# Patient Record
Sex: FEMALE | Race: AMERICAN INDIAN OR ALASKA NATIVE | NOT HISPANIC OR LATINO | URBAN - METROPOLITAN AREA
[De-identification: names, ages, dates, MRNs, and addresses within clinical notes are randomized per-mention and may not be internally consistent; named-entity substitution may affect disease eponyms.]

---

## 2023-11-01 PROBLEM — H52.203 MYOPIA OF BOTH EYES WITH ASTIGMATISM: Status: ACTIVE | Noted: 2023-11-01

## 2023-11-01 PROBLEM — M25.511 RIGHT SHOULDER PAIN: Status: ACTIVE | Noted: 2023-11-01

## 2023-11-01 PROBLEM — M77.31 CALCANEAL SPUR OF RIGHT FOOT: Status: ACTIVE | Noted: 2023-11-01

## 2023-11-01 PROBLEM — M25.521 RIGHT ELBOW PAIN: Status: ACTIVE | Noted: 2023-11-01

## 2023-11-01 PROBLEM — H52.13 MYOPIA OF BOTH EYES WITH ASTIGMATISM: Status: ACTIVE | Noted: 2023-11-01

## 2023-11-01 PROBLEM — J30.1 HAYFEVER: Status: ACTIVE | Noted: 2023-11-01

## 2023-11-01 PROBLEM — R00.2 PALPITATION: Status: ACTIVE | Noted: 2023-11-01

## 2023-11-01 PROBLEM — R92.0 ABNORMAL MAMMOGRAM WITH MICROCALCIFICATION: Status: ACTIVE | Noted: 2023-11-01

## 2023-11-01 RX ORDER — EPINASTINE HYDROCHLORIDE 0.5 MG/ML
SOLUTION/ DROPS OPHTHALMIC 2 TIMES DAILY
COMMUNITY
Start: 2023-05-04

## 2023-11-01 RX ORDER — ETONOGESTREL AND ETHINYL ESTRADIOL VAGINAL RING .015; .12 MG/D; MG/D
RING VAGINAL
COMMUNITY

## 2023-11-01 RX ORDER — AZELASTINE 1 MG/ML
1 SPRAY, METERED NASAL 2 TIMES DAILY
COMMUNITY
Start: 2023-07-06

## 2023-11-02 ENCOUNTER — CLINICAL SUPPORT (OUTPATIENT)
Dept: ALLERGY | Facility: CLINIC | Age: 53
End: 2023-11-02
Payer: COMMERCIAL

## 2023-11-02 DIAGNOSIS — J30.1 ACUTE SEASONAL ALLERGIC RHINITIS DUE TO POLLEN: Primary | ICD-10-CM

## 2023-11-02 PROCEDURE — 95117 IMMUNOTHERAPY INJECTIONS: CPT | Performed by: PEDIATRICS

## 2023-11-02 NOTE — PROGRESS NOTES
"Subjective   Patient ID: Halley Dave is a 52 y.o. female.    Chief Complaint: Allergy Shot      ALLERGEN IMMUNOTHERAPY ADMINISTRATION FORM:  ##########################################################  Vial A: ct, dog, dust mite   Vial B: tree, ragweed, weeds     HALLEY has reached the allergy shot MAINTENANCE dose on: 12/27/2018  ##########################################################       ========================================  06/02/2022 - allergy testing showed hugely improved results. We'll continue the shots for 1 more year and talk about stopping after May 2023  ========================================          Vial A 1:1 0.5 ml 08/03/2023 12:31 PM,Y;Y;R;300/;No Reaction;RRA  Vial B 1:1 0.5 ml _____________\"______________L;No Reaction;RRA     Vial A 1:1 0.5 ml 08/31/2023 11:50 AM,Y;Y;R;350/;No Reaction;RRA  Vial B 1:1 0.5 ml _____________\"______________L;No Reaction;RRA     Vial A 1:1 0.5 ml 09/28/2023 2:48 PM,Y;Y;R;320/;No Reaction;RRA  Vial B 1:1 0.5 ml _____________\"______________L;No Reaction;RRA     Vial A 1:1 0.5 ml   Vial B 1:1 0.5 ml11/02/23 11:34 AM R:RRA   ________________________L; RRA  -Issues last injection: None  -Allergy meds taken today:  Yes  -Pre Peakflow: 310  -Post Peakflow:  -Reaction: None  -Next shot in 4 weeks    Vial A 1:1 0.5 ml   Vial B 1:1 0.5 ml     Vial A 1:1 0.5 ml   Vial B 1:1 0.5 ml      Signatures   Electronically signed by : Donna Abdul MD; Oct  2 2023  1:04AM EST (Author)       "

## 2023-11-30 ENCOUNTER — CLINICAL SUPPORT (OUTPATIENT)
Dept: ALLERGY | Facility: CLINIC | Age: 53
End: 2023-11-30
Payer: COMMERCIAL

## 2023-11-30 DIAGNOSIS — J30.1 ACUTE SEASONAL ALLERGIC RHINITIS DUE TO POLLEN: Primary | ICD-10-CM

## 2023-11-30 NOTE — PROGRESS NOTES
"Subjective   Patient ID: Halley Dave is a 52 y.o. female.    Chief Complaint: Allergy Shot      ALLERGEN IMMUNOTHERAPY ADMINISTRATION FORM:  ##########################################################  Vial A: ct, dog, dust mite   Vial B: tree, ragweed, weeds     HALLEY has reached the allergy shot MAINTENANCE dose on: 12/27/2018  ##########################################################       ========================================  06/02/2022 - allergy testing showed hugely improved results. We'll continue the shots for 1 more year and talk about stopping after May 2023  ========================================          Vial A 1:1 0.5 ml 08/03/2023 12:31 PM,Y;Y;R;300/;No Reaction;RRA  Vial B 1:1 0.5 ml _____________\"______________L;No Reaction;RRA     Vial A 1:1 0.5 ml 08/31/2023 11:50 AM,Y;Y;R;350/;No Reaction;RRA  Vial B 1:1 0.5 ml _____________\"______________L;No Reaction;RRA     Vial A 1:1 0.5 ml 09/28/2023 2:48 PM,Y;Y;R;320/;No Reaction;RRA  Vial B 1:1 0.5 ml _____________\"______________L;No Reaction;RRA     Vial A 1:1 0.5 ml   Vial B 1:1 0.5 ml11/02/23 11:34 AM R:RRA   ________________________L; RRA  -Issues last injection: None  -Allergy meds taken today:  Yes  -Pre Peakflow: 310  -Post Peakflow:  -Reaction: None  -Next shot in 4 weeks    Vial A 1:1 0.5 ml 11/30/23 11:47 AM R:RRA  Vial B 1:1 0.5 ml________________________L; RRA  -Issues last injection: None  -Allergy meds taken today:  Yes  -Pre Peakflow: 330  -Post Peakflow:  -Reaction: None  -Next shot in 4 weeks     Vial A 1:1 0.5 ml   Vial B 1:1 0.5 ml     Vial A 1:1 0.5 ml   Vial B 1:1 0.5 ml     Vial A 1:1 0.5 ml   Vial B 1:1 0.5 ml     Vial A 1:1 0.5 ml   Vial B 1:1 0.5 ml     Vial A 1:1 0.5 ml   Vial B 1:1 0.5 ml   "

## 2023-12-28 ENCOUNTER — CLINICAL SUPPORT (OUTPATIENT)
Dept: ALLERGY | Facility: CLINIC | Age: 53
End: 2023-12-28
Payer: COMMERCIAL

## 2023-12-28 DIAGNOSIS — J30.1 ACUTE SEASONAL ALLERGIC RHINITIS DUE TO POLLEN: Primary | ICD-10-CM

## 2023-12-28 PROCEDURE — 95117 IMMUNOTHERAPY INJECTIONS: CPT | Performed by: PEDIATRICS

## 2023-12-28 NOTE — PROGRESS NOTES
"Subjective   Patient ID: Halley Dave is a 52 y.o. female.    Chief Complaint: Allergy Shot      ALLERGEN IMMUNOTHERAPY ADMINISTRATION FORM:  ##########################################################  Vial A: ct, dog, dust mite   Vial B: tree, ragweed, weeds     HALLEY has reached the allergy shot MAINTENANCE dose on: 12/27/2018  ##########################################################       ========================================  06/02/2022 - allergy testing showed hugely improved results. We'll continue the shots for 1 more year and talk about stopping after May 2023  ========================================          Vial A 1:1 0.5 ml 08/03/2023 12:31 PM,Y;Y;R;300/;No Reaction;RRA  Vial B 1:1 0.5 ml _____________\"______________L;No Reaction;RRA     Vial A 1:1 0.5 ml 08/31/2023 11:50 AM,Y;Y;R;350/;No Reaction;RRA  Vial B 1:1 0.5 ml _____________\"______________L;No Reaction;RRA     Vial A 1:1 0.5 ml 09/28/2023 2:48 PM,Y;Y;R;320/;No Reaction;RRA  Vial B 1:1 0.5 ml _____________\"______________L;No Reaction;RRA     Vial A 1:1 0.5 ml   Vial B 1:1 0.5 ml11/02/23 11:34 AM R:RRA   ________________________L; RRA  -Issues last injection: None  -Allergy meds taken today:  Yes  -Pre Peakflow: 310  -Post Peakflow:  -Reaction: None  -Next shot in 4 weeks    Vial A 1:1 0.5 ml 11/30/23 11:47 AM R:RRA  Vial B 1:1 0.5 ml________________________L; RRA  -Issues last injection: None  -Allergy meds taken today:  Yes  -Pre Peakflow: 330  -Post Peakflow:  -Reaction: None  -Next shot in 4 weeks     Vial A 1:1 0.5 ml 12/28/23 2:15 PM R:RRA  Vial B 1:1 0.5 ml ________________________L; RRA  -Issues last injection: None  -Allergy meds taken today:  Yes  -Pre Peakflow: 320  -Post Peakflow: 320  -Reaction: None  -Next shot in 4 weeks    Vial A 1:1 0.5 ml   Vial B 1:1 0.5 ml     Vial A 1:1 0.5 ml   Vial B 1:1 0.5 ml     Vial A 1:1 0.5 ml   Vial B 1:1 0.5 ml     Vial A 1:1 0.5 ml   Vial B 1:1 0.5 ml   "

## 2024-02-29 ENCOUNTER — CLINICAL SUPPORT (OUTPATIENT)
Dept: ALLERGY | Facility: CLINIC | Age: 54
End: 2024-02-29
Payer: COMMERCIAL

## 2024-02-29 DIAGNOSIS — J30.1 ACUTE SEASONAL ALLERGIC RHINITIS DUE TO POLLEN: Primary | ICD-10-CM

## 2024-02-29 PROCEDURE — 95117 IMMUNOTHERAPY INJECTIONS: CPT | Performed by: PEDIATRICS

## 2024-02-29 PROCEDURE — 95165 ANTIGEN THERAPY SERVICES: CPT | Performed by: PEDIATRICS

## 2024-02-29 NOTE — PROGRESS NOTES
"Subjective   Patient ID: Halley Dave is a 52 y.o. female.    Chief Complaint: Allergy Shot      ALLERGEN IMMUNOTHERAPY ADMINISTRATION FORM:  ##########################################################  Vial A: ct, dog, dust mite   Vial B: tree, ragweed, weeds     HALLEY has reached the allergy shot MAINTENANCE dose on: 12/27/2018  ##########################################################       ========================================  06/02/2022 - allergy testing showed hugely improved results. We'll continue the shots for 1 more year and talk about stopping after May 2023  ========================================          Vial A 1:1 0.5 ml 08/03/2023 12:31 PM,Y;Y;R;300/;No Reaction;RRA  Vial B 1:1 0.5 ml _____________\"______________L;No Reaction;RRA     Vial A 1:1 0.5 ml 08/31/2023 11:50 AM,Y;Y;R;350/;No Reaction;RRA  Vial B 1:1 0.5 ml _____________\"______________L;No Reaction;RRA     Vial A 1:1 0.5 ml 09/28/2023 2:48 PM,Y;Y;R;320/;No Reaction;RRA  Vial B 1:1 0.5 ml _____________\"______________L;No Reaction;RRA     Vial A 1:1 0.5 ml   Vial B 1:1 0.5 ml11/02/23 11:34 AM R:RRA   ________________________L; RRA  -Issues last injection: None  -Allergy meds taken today:  Yes  -Pre Peakflow: 310  -Post Peakflow:  -Reaction: None  -Next shot in 4 weeks    Vial A 1:1 0.5 ml 11/30/23 11:47 AM R:RRA  Vial B 1:1 0.5 ml________________________L; RRA  -Issues last injection: None  -Allergy meds taken today:  Yes  -Pre Peakflow: 330  -Post Peakflow:  -Reaction: None  -Next shot in 4 weeks     Vial A 1:1 0.5 ml 12/28/2023 11:05 AM R:RRA  Vial B 1:1 0.5 ml ________________________L; RRA  -Issues last injection: None  -Allergy meds taken today:  Yes  -Pre Peakflow: 320  -Post Peakflow: 320  -Reaction: None  -Next shot in 4 weeks    Vial A 1:1 0.25 ml(late) 02/29/24 11:05 AM R:RRA  Vial B 1:1 0.25 ml (late)________________________L; RRA  -Issues last injection: None  -Allergy meds taken today:  Yes  -Pre Peakflow: 280  -Post " Peakflow:  -Reaction: None  -Next shot in 4 weeks * needs new vials*    Vial A 1:1 0.25 ml (new vial)   Vial B 1:1 0.25 ml (new vial)   Come back in 4 weeks      Vial A 1:1 0.5 ml   Vial B 1:1 0.5 ml     Vial A 1:1 0.5 ml   Vial B 1:1 0.5 ml

## 2024-03-10 NOTE — PROGRESS NOTES
#####################################################     Vial: A Exp Date: 3/6/2025           Extract Name---------------------- Concentration----------------- Amount (ml) / Lot # / Expiration Date          AP Dog Hair and Dander------- 1:100 w/v (G)---------------- 1 [id number] 6/11/2026   Cat hair---------------------------- 10,000 BAU/mL (G) -------- 1 [id number] 9/24/2025   D Farinae ------------------------- 10,000 AU/mL (G)---------- 0.5 [id number] 5/7/2026   D Pteronyssinus ----------------- 10,000 AU/mL (G)---------- 0.5 [id number] 12/28/2024   Diluent -------------------------------- Albumin/Saline/ Phenol---- 2 [id number] 6/30/2027          TOTAL VOLUME (ML)------------------------------------------ 5 ml      #####################################################     Vial: B Exp Date: 3/6/2025           Extract Name---------------------- Concentration----------------- Amount (ml) / Lot # / Expiration Date          René, White ----------------------- 1:20 w/v  (Aq)---------------- 0.5 [id number] 6/18/2025   Birch, Red/River----------------- 1:10 w/v  (Aq)---------------- 0.3 [id number] 8/2/2025   Mugwort, Common-------------- 1:20 w/v  (G)----------------- 0.65 [id number] 11/15/2025   [place]field, Red-------------------------- 1:10w/v (Aq)----------------- 0.3 [id number] 6/4/2026   Ragweed mix -------------------- 1:20 w/v  (G)----------------- 0.45 [id number] 8/31/2025   Diluent -------------------------------- Albumin/Saline/ Phenol---- 2.8 [id number] 6/30/2027          TOTAL VOLUME (ML)------------------------------------------ 5 ml

## 2024-03-28 ENCOUNTER — CLINICAL SUPPORT (OUTPATIENT)
Dept: ALLERGY | Facility: CLINIC | Age: 54
End: 2024-03-28
Payer: COMMERCIAL

## 2024-03-28 DIAGNOSIS — J30.1 ACUTE SEASONAL ALLERGIC RHINITIS DUE TO POLLEN: Primary | ICD-10-CM

## 2024-03-28 PROCEDURE — 95117 IMMUNOTHERAPY INJECTIONS: CPT | Performed by: PEDIATRICS

## 2024-03-28 NOTE — PROGRESS NOTES
"Subjective   Patient ID: Halley Dave is a 52 y.o. female.    Chief Complaint: Allergy Shot      ALLERGEN IMMUNOTHERAPY ADMINISTRATION FORM:  ##########################################################  Vial A: ct, dog, dust mite   Vial B: tree, ragweed, weeds     HALLEY has reached the allergy shot MAINTENANCE dose on: 12/27/2018  ##########################################################       ========================================  06/02/2022 - allergy testing showed hugely improved results. We'll continue the shots for 1 more year and talk about stopping after May 2023  ========================================          Vial A 1:1 0.5 ml 08/03/2023 12:31 PM,Y;Y;R;300/;No Reaction;RRA  Vial B 1:1 0.5 ml _____________\"______________L;No Reaction;RRA     Vial A 1:1 0.5 ml 08/31/2023 11:50 AM,Y;Y;R;350/;No Reaction;RRA  Vial B 1:1 0.5 ml _____________\"______________L;No Reaction;RRA     Vial A 1:1 0.5 ml 09/28/2023 2:48 PM,Y;Y;R;320/;No Reaction;RRA  Vial B 1:1 0.5 ml _____________\"______________L;No Reaction;RRA     Vial A 1:1 0.5 ml   Vial B 1:1 0.5 ml11/02/23 11:34 AM R:RRA   ________________________L; RRA  -Issues last injection: None  -Allergy meds taken today:  Yes  -Pre Peakflow: 310  -Post Peakflow:  -Reaction: None  -Next shot in 4 weeks    Vial A 1:1 0.5 ml 11/30/23 11:47 AM R:RRA  Vial B 1:1 0.5 ml________________________L; RRA  -Issues last injection: None  -Allergy meds taken today:  Yes  -Pre Peakflow: 330  -Post Peakflow:  -Reaction: None  -Next shot in 4 weeks     Vial A 1:1 0.5 ml 12/28/2023 11:05 AM R:RRA  Vial B 1:1 0.5 ml ________________________L; RRA  -Issues last injection: None  -Allergy meds taken today:  Yes  -Pre Peakflow: 320  -Post Peakflow: 320  -Reaction: None  -Next shot in 4 weeks    Vial A 1:1 0.25 ml(late) 02/29/24 11:05 AM R:RRA  Vial B 1:1 0.25 ml (late)________________________L; RRA  -Issues last injection: None  -Allergy meds taken today:  Yes  -Pre Peakflow: 280  -Post " Peakflow:  -Reaction: None  -Next shot in 4 weeks * needs new vials*    Vial A 1:1 0.25 ml (new vial) 03/28/24 11:35 AM R:RRA  Vial B 1:1 0.25 ml (new vial) ________________________L; RRA  -Issues last injection: None  -Allergy meds taken today:  Yes  -Pre Peakflow: 270  -Post Peakflow:  -Reaction: None  -Next shot in 4 weeks      Vial A 1:1 0.5 ml   Vial B 1:1 0.5 ml     Vial A 1:1 0.5 ml   Vial B 1:1 0.5 ml

## 2024-04-25 ENCOUNTER — CLINICAL SUPPORT (OUTPATIENT)
Dept: ALLERGY | Facility: CLINIC | Age: 54
End: 2024-04-25
Payer: COMMERCIAL

## 2024-04-25 DIAGNOSIS — J30.1 ACUTE SEASONAL ALLERGIC RHINITIS DUE TO POLLEN: Primary | ICD-10-CM

## 2024-04-25 PROCEDURE — 95115 IMMUNOTHERAPY ONE INJECTION: CPT | Performed by: PEDIATRICS

## 2024-04-25 NOTE — PROGRESS NOTES
"Subjective   Patient ID: Halley Dave is a 52 y.o. female.    Chief Complaint: Allergy Shot      ALLERGEN IMMUNOTHERAPY ADMINISTRATION FORM:  ##########################################################  Vial A: ct, dog, dust mite   Vial B: tree, ragweed, weeds     HALLEY has reached the allergy shot MAINTENANCE dose on: 12/27/2018  ##########################################################       ========================================  06/02/2022 - allergy testing showed hugely improved results. We'll continue the shots for 1 more year and talk about stopping after May 2023  ========================================          Vial A 1:1 0.5 ml 08/03/2023 12:31 PM,Y;Y;R;300/;No Reaction;RRA  Vial B 1:1 0.5 ml _____________\"______________L;No Reaction;RRA     Vial A 1:1 0.5 ml 08/31/2023 11:50 AM,Y;Y;R;350/;No Reaction;RRA  Vial B 1:1 0.5 ml _____________\"______________L;No Reaction;RRA     Vial A 1:1 0.5 ml 09/28/2023 2:48 PM,Y;Y;R;320/;No Reaction;RRA  Vial B 1:1 0.5 ml _____________\"______________L;No Reaction;RRA     Vial A 1:1 0.5 ml   Vial B 1:1 0.5 ml11/02/23 11:34 AM R:RRA   ________________________L; RRA  -Issues last injection: None  -Allergy meds taken today:  Yes  -Pre Peakflow: 310  -Post Peakflow:  -Reaction: None  -Next shot in 4 weeks    Vial A 1:1 0.5 ml 11/30/23 11:47 AM R:RRA  Vial B 1:1 0.5 ml________________________L; RRA  -Issues last injection: None  -Allergy meds taken today:  Yes  -Pre Peakflow: 330  -Post Peakflow:  -Reaction: None  -Next shot in 4 weeks     Vial A 1:1 0.5 ml 12/28/2023 11:05 AM R:RRA  Vial B 1:1 0.5 ml ________________________L; RRA  -Issues last injection: None  -Allergy meds taken today:  Yes  -Pre Peakflow: 320  -Post Peakflow: 320  -Reaction: None  -Next shot in 4 weeks    Vial A 1:1 0.25 ml(late) 02/29/24 11:05 AM R:RRA  Vial B 1:1 0.25 ml (late)________________________L; RRA  -Issues last injection: None  -Allergy meds taken today:  Yes  -Pre Peakflow: 280  -Post " Peakflow:  -Reaction: None  -Next shot in 4 weeks * needs new vials*    Vial A 1:1 0.25 ml (new vial) 03/28/24 11:35 AM R:RRA  Vial B 1:1 0.25 ml (new vial) ________________________L; RRA  -Issues last injection: None  -Allergy meds taken today:  Yes  -Pre Peakflow: 270  -Post Peakflow:  -Reaction: None  -Next shot in 4 weeks      Vial A 1:1 0.5 ml 04/25/24 9:22 AM R:RRA  Vial B 1:1 0.5 ml ________________________L; RRA  -Issues last injection: None  -Allergy meds taken today:  Yes  -Pre Peakflow: 330  -Post Peakflow:  -Reaction: None  -Next shot in 4 weeks    Vial A 1:1 0.5 ml   Vial B 1:1 0.5 ml

## 2024-05-23 ENCOUNTER — CLINICAL SUPPORT (OUTPATIENT)
Dept: ALLERGY | Facility: CLINIC | Age: 54
End: 2024-05-23
Payer: COMMERCIAL

## 2024-05-23 DIAGNOSIS — J30.1 ACUTE SEASONAL ALLERGIC RHINITIS DUE TO POLLEN: Primary | ICD-10-CM

## 2024-05-23 PROCEDURE — 95117 IMMUNOTHERAPY INJECTIONS: CPT | Performed by: PEDIATRICS

## 2024-05-31 NOTE — PROGRESS NOTES
"Subjective   Patient ID: Halley Dave is a 52 y.o. female.    Chief Complaint: Allergy Shot      ALLERGEN IMMUNOTHERAPY ADMINISTRATION FORM:  ##########################################################  Vial A: ct, dog, dust mite   Vial B: tree, ragweed, weeds     HALLEY has reached the allergy shot MAINTENANCE dose on: 12/27/2018  ##########################################################       ========================================  06/02/2022 - allergy testing showed hugely improved results. We'll continue the shots for 1 more year and talk about stopping after May 2023  ========================================          Vial A 1:1 0.5 ml 08/03/2023 12:31 PM,Y;Y;R;300/;No Reaction;RRA  Vial B 1:1 0.5 ml _____________\"______________L;No Reaction;RRA     Vial A 1:1 0.5 ml 08/31/2023 11:50 AM,Y;Y;R;350/;No Reaction;RRA  Vial B 1:1 0.5 ml _____________\"______________L;No Reaction;RRA     Vial A 1:1 0.5 ml 09/28/2023 2:48 PM,Y;Y;R;320/;No Reaction;RRA  Vial B 1:1 0.5 ml _____________\"______________L;No Reaction;RRA     Vial A 1:1 0.5 ml   Vial B 1:1 0.5 ml11/02/23 11:34 AM R:RRA   ________________________L; RRA  -Issues last injection: None  -Allergy meds taken today:  Yes  -Pre Peakflow: 310  -Post Peakflow:  -Reaction: None  -Next shot in 4 weeks    Vial A 1:1 0.5 ml 11/30/23 11:47 AM R:RRA  Vial B 1:1 0.5 ml________________________L; RRA  -Issues last injection: None  -Allergy meds taken today:  Yes  -Pre Peakflow: 330  -Post Peakflow:  -Reaction: None  -Next shot in 4 weeks     Vial A 1:1 0.5 ml 12/28/2023 11:05 AM R:RRA  Vial B 1:1 0.5 ml ________________________L; RRA  -Issues last injection: None  -Allergy meds taken today:  Yes  -Pre Peakflow: 320  -Post Peakflow: 320  -Reaction: None  -Next shot in 4 weeks    Vial A 1:1 0.25 ml(late) 02/29/24 11:05 AM R:RRA  Vial B 1:1 0.25 ml (late)________________________L; RRA  -Issues last injection: None  -Allergy meds taken today:  Yes  -Pre Peakflow: 280  -Post " Peakflow:  -Reaction: None  -Next shot in 4 weeks * needs new vials*    Vial A 1:1 0.25 ml (new vial) 03/28/24 11:35 AM R:RRA  Vial B 1:1 0.25 ml (new vial) ________________________L; RRA  -Issues last injection: None  -Allergy meds taken today:  Yes  -Pre Peakflow: 270  -Post Peakflow:  -Reaction: None  -Next shot in 4 weeks      Vial A 1:1 0.5 ml 04/25/24 9:22 AM R:RRA  Vial B 1:1 0.5 ml ________________________L; RRA  -Issues last injection: None  -Allergy meds taken today:  Yes  -Pre Peakflow: 330  -Post Peakflow:  -Reaction: None  -Next shot in 4 weeks    Vial A 1:1 0.5 ml 5/23/2024 shot given by not documented  Vial B 1:1 0.5 ml 5/23/2024 shot given but not documented

## 2024-06-20 ENCOUNTER — APPOINTMENT (OUTPATIENT)
Dept: ALLERGY | Facility: CLINIC | Age: 54
End: 2024-06-20
Payer: COMMERCIAL

## 2024-06-27 ENCOUNTER — CLINICAL SUPPORT (OUTPATIENT)
Dept: ALLERGY | Facility: CLINIC | Age: 54
End: 2024-06-27
Payer: COMMERCIAL

## 2024-06-27 DIAGNOSIS — J30.1 ACUTE SEASONAL ALLERGIC RHINITIS DUE TO POLLEN: Primary | ICD-10-CM

## 2024-06-27 PROCEDURE — 95115 IMMUNOTHERAPY ONE INJECTION: CPT | Performed by: PEDIATRICS

## 2024-06-27 NOTE — PROGRESS NOTES
"Subjective   Patient ID: Halley Dave is a 52 y.o. female.    Chief Complaint: Allergy Shot      ALLERGEN IMMUNOTHERAPY ADMINISTRATION FORM:  ##########################################################  Vial A: ct, dog, dust mite   Vial B: tree, ragweed, weeds     HALLEY has reached the allergy shot MAINTENANCE dose on: 12/27/2018  ##########################################################       ========================================  06/02/2022 - allergy testing showed hugely improved results. We'll continue the shots for 1 more year and talk about stopping after May 2023  ========================================          Vial A 1:1 0.5 ml 08/03/2023 12:31 PM,Y;Y;R;300/;No Reaction;RRA  Vial B 1:1 0.5 ml _____________\"______________L;No Reaction;RRA     Vial A 1:1 0.5 ml 08/31/2023 11:50 AM,Y;Y;R;350/;No Reaction;RRA  Vial B 1:1 0.5 ml _____________\"______________L;No Reaction;RRA     Vial A 1:1 0.5 ml 09/28/2023 2:48 PM,Y;Y;R;320/;No Reaction;RRA  Vial B 1:1 0.5 ml _____________\"______________L;No Reaction;RRA     Vial A 1:1 0.5 ml   Vial B 1:1 0.5 ml11/02/23 11:34 AM R:RRA   ________________________L; RRA  -Issues last injection: None  -Allergy meds taken today:  Yes  -Pre Peakflow: 310  -Post Peakflow:  -Reaction: None  -Next shot in 4 weeks    Vial A 1:1 0.5 ml 11/30/23 11:47 AM R:RRA  Vial B 1:1 0.5 ml________________________L; RRA  -Issues last injection: None  -Allergy meds taken today:  Yes  -Pre Peakflow: 330  -Post Peakflow:  -Reaction: None  -Next shot in 4 weeks     Vial A 1:1 0.5 ml 12/28/2023 11:05 AM R:RRA  Vial B 1:1 0.5 ml ________________________L; RRA  -Issues last injection: None  -Allergy meds taken today:  Yes  -Pre Peakflow: 320  -Post Peakflow: 320  -Reaction: None  -Next shot in 4 weeks    Vial A 1:1 0.25 ml(late) 02/29/24 11:05 AM R:RRA  Vial B 1:1 0.25 ml (late)________________________L; RRA  -Issues last injection: None  -Allergy meds taken today:  Yes  -Pre Peakflow: 280  -Post " Peakflow:  -Reaction: None  -Next shot in 4 weeks * needs new vials*    Vial A 1:1 0.25 ml (new vial) 03/28/24 11:35 AM R:RRA  Vial B 1:1 0.25 ml (new vial) ________________________L; RRA  -Issues last injection: None  -Allergy meds taken today:  Yes  -Pre Peakflow: 270  -Post Peakflow:  -Reaction: None  -Next shot in 4 weeks      Vial A 1:1 0.5 ml 04/25/24 9:22 AM R:RRA  Vial B 1:1 0.5 ml ________________________L; RRA  -Issues last injection: None  -Allergy meds taken today:  Yes  -Pre Peakflow: 330  -Post Peakflow:  -Reaction: None  -Next shot in 4 weeks    Vial A 1:1 0.5 ml 5/23/2024 shot given by not documented  Vial B 1:1 0.5 ml 5/23/2024 shot given but not documented    Vial A 1:1 0.5 ml 06/27/24 12:36 PM R:RRA   Vial B 1:1 0.5 ml ________________________L; RRA   -Issues last injection: None  -Allergy meds taken today:  Yes  -Pre Peakflow: 290  -Post Peakflow:  -Reaction: None  -Next shot in 4 weeks

## 2024-07-18 ENCOUNTER — APPOINTMENT (OUTPATIENT)
Dept: ALLERGY | Facility: CLINIC | Age: 54
End: 2024-07-18
Payer: COMMERCIAL

## 2024-07-18 DIAGNOSIS — J30.1 ACUTE SEASONAL ALLERGIC RHINITIS DUE TO POLLEN: Primary | ICD-10-CM

## 2024-07-18 PROCEDURE — 95117 IMMUNOTHERAPY INJECTIONS: CPT | Performed by: PEDIATRICS

## 2024-07-18 NOTE — PROGRESS NOTES
"Subjective   Patient ID: Halley Dave is a 52 y.o. female.    Chief Complaint: Allergy Shot      ALLERGEN IMMUNOTHERAPY ADMINISTRATION FORM:  ##########################################################  Vial A: ct, dog, dust mite   Vial B: tree, ragweed, weeds     HALLEY has reached the allergy shot MAINTENANCE dose on: 12/27/2018  ##########################################################       ========================================  06/02/2022 - allergy testing showed hugely improved results. We'll continue the shots for 1 more year and talk about stopping after May 2023  ========================================          Vial A 1:1 0.5 ml 08/03/2023 12:31 PM,Y;Y;R;300/;No Reaction;RRA  Vial B 1:1 0.5 ml _____________\"______________L;No Reaction;RRA     Vial A 1:1 0.5 ml 08/31/2023 11:50 AM,Y;Y;R;350/;No Reaction;RRA  Vial B 1:1 0.5 ml _____________\"______________L;No Reaction;RRA     Vial A 1:1 0.5 ml 09/28/2023 2:48 PM,Y;Y;R;320/;No Reaction;RRA  Vial B 1:1 0.5 ml _____________\"______________L;No Reaction;RRA     Vial A 1:1 0.5 ml   Vial B 1:1 0.5 ml11/02/23 11:34 AM R:RRA   ________________________L; RRA  -Issues last injection: None  -Allergy meds taken today:  Yes  -Pre Peakflow: 310  -Post Peakflow:  -Reaction: None  -Next shot in 4 weeks    Vial A 1:1 0.5 ml 11/30/23 11:47 AM R:RRA  Vial B 1:1 0.5 ml________________________L; RRA  -Issues last injection: None  -Allergy meds taken today:  Yes  -Pre Peakflow: 330  -Post Peakflow:  -Reaction: None  -Next shot in 4 weeks     Vial A 1:1 0.5 ml 12/28/2023 11:05 AM R:RRA  Vial B 1:1 0.5 ml ________________________L; RRA  -Issues last injection: None  -Allergy meds taken today:  Yes  -Pre Peakflow: 320  -Post Peakflow: 320  -Reaction: None  -Next shot in 4 weeks    Vial A 1:1 0.25 ml(late) 02/29/24 11:05 AM R:RRA  Vial B 1:1 0.25 ml (late)________________________L; RRA  -Issues last injection: None  -Allergy meds taken today:  Yes  -Pre Peakflow: 280  -Post " Peakflow:  -Reaction: None  -Next shot in 4 weeks * needs new vials*    Vial A 1:1 0.25 ml (new vial) 03/28/24 11:35 AM R:RRA  Vial B 1:1 0.25 ml (new vial) ________________________L; RRA  -Issues last injection: None  -Allergy meds taken today:  Yes  -Pre Peakflow: 270  -Post Peakflow:  -Reaction: None  -Next shot in 4 weeks      Vial A 1:1 0.5 ml 04/25/24 9:22 AM R:RRA  Vial B 1:1 0.5 ml ________________________L; RRA  -Issues last injection: None  -Allergy meds taken today:  Yes  -Pre Peakflow: 330  -Post Peakflow:  -Reaction: None  -Next shot in 4 weeks    Vial A 1:1 0.5 ml 5/23/2024 shot given by not documented  Vial B 1:1 0.5 ml 5/23/2024 shot given but not documented    Vial A 1:1 0.5 ml 06/27/24 12:36 PM R:RRA   Vial B 1:1 0.5 ml ________________________L; RRA   -Issues last injection: None  -Allergy meds taken today:  Yes  -Pre Peakflow: 290  -Post Peakflow:  -Reaction: None  -Next shot in 4 weeks     Vial A 1:1 0.5 ml 07/18/24 12:02 PM R:RRA  Vial B 1:1 0.5 ml ________________________L; RRA   -Issues last injection: None  -Allergy meds taken today:  Yes  -Pre Peakflow: Lungs Clear - by way of auscultation    -Post Peakflow:  -Reaction: None  -Next shot in 4 weeks

## 2024-08-15 ENCOUNTER — APPOINTMENT (OUTPATIENT)
Dept: ALLERGY | Facility: CLINIC | Age: 54
End: 2024-08-15
Payer: COMMERCIAL

## 2024-08-15 DIAGNOSIS — J30.1 ACUTE SEASONAL ALLERGIC RHINITIS DUE TO POLLEN: Primary | ICD-10-CM

## 2024-08-15 PROCEDURE — 95117 IMMUNOTHERAPY INJECTIONS: CPT | Performed by: PEDIATRICS

## 2024-08-15 NOTE — PROGRESS NOTES
"Subjective   Patient ID: Halley Dave is a 52 y.o. female.    Chief Complaint: Allergy Shot      ALLERGEN IMMUNOTHERAPY ADMINISTRATION FORM:  ##########################################################  Vial A: ct, dog, dust mite   Vial B: tree, ragweed, weeds     HALLEY has reached the allergy shot MAINTENANCE dose on: 12/27/2018  ##########################################################       ========================================  06/02/2022 - allergy testing showed hugely improved results. We'll continue the shots for 1 more year and talk about stopping after May 2023  ========================================          Vial A 1:1 0.5 ml 08/03/2023 12:31 PM,Y;Y;R;300/;No Reaction;RRA  Vial B 1:1 0.5 ml _____________\"______________L;No Reaction;RRA     Vial A 1:1 0.5 ml 08/31/2023 11:50 AM,Y;Y;R;350/;No Reaction;RRA  Vial B 1:1 0.5 ml _____________\"______________L;No Reaction;RRA     Vial A 1:1 0.5 ml 09/28/2023 2:48 PM,Y;Y;R;320/;No Reaction;RRA  Vial B 1:1 0.5 ml _____________\"______________L;No Reaction;RRA     Vial A 1:1 0.5 ml   Vial B 1:1 0.5 ml11/02/23 11:34 AM R:RRA   ________________________L; RRA  -Issues last injection: None  -Allergy meds taken today:  Yes  -Pre Peakflow: 310  -Post Peakflow:  -Reaction: None  -Next shot in 4 weeks    Vial A 1:1 0.5 ml 11/30/23 11:47 AM R:RRA  Vial B 1:1 0.5 ml________________________L; RRA  -Issues last injection: None  -Allergy meds taken today:  Yes  -Pre Peakflow: 330  -Post Peakflow:  -Reaction: None  -Next shot in 4 weeks     Vial A 1:1 0.5 ml 12/28/2023 11:05 AM R:RRA  Vial B 1:1 0.5 ml ________________________L; RRA  -Issues last injection: None  -Allergy meds taken today:  Yes  -Pre Peakflow: 320  -Post Peakflow: 320  -Reaction: None  -Next shot in 4 weeks    Vial A 1:1 0.25 ml(late) 02/29/24 11:05 AM R:RRA  Vial B 1:1 0.25 ml (late)________________________L; RRA  -Issues last injection: None  -Allergy meds taken today:  Yes  -Pre Peakflow: 280  -Post " Peakflow:  -Reaction: None  -Next shot in 4 weeks * needs new vials*    Vial A 1:1 0.25 ml (new vial) 03/28/24 11:35 AM R:RRA  Vial B 1:1 0.25 ml (new vial) ________________________L; RRA  -Issues last injection: None  -Allergy meds taken today:  Yes  -Pre Peakflow: 270  -Post Peakflow:  -Reaction: None  -Next shot in 4 weeks      Vial A 1:1 0.5 ml 04/25/24 9:22 AM R:RRA  Vial B 1:1 0.5 ml ________________________L; RRA  -Issues last injection: None  -Allergy meds taken today:  Yes  -Pre Peakflow: 330  -Post Peakflow:  -Reaction: None  -Next shot in 4 weeks    Vial A 1:1 0.5 ml 5/23/2024 shot given by not documented  Vial B 1:1 0.5 ml 5/23/2024 shot given but not documented    Vial A 1:1 0.5 ml 06/27/24 12:36 PM R:RRA   Vial B 1:1 0.5 ml ________________________L; RRA   -Issues last injection: None  -Allergy meds taken today:  Yes  -Pre Peakflow: 290  -Post Peakflow:  -Reaction: None  -Next shot in 4 weeks     Vial A 1:1 0.5 ml 07/18/24 12:02 PM R:RRA  Vial B 1:1 0.5 ml ________________________L; RRA   -Issues last injection: None  -Allergy meds taken today:  Yes  -Pre Peakflow: Lungs Clear - by way of auscultation    -Post Peakflow:  -Reaction: None  -Next shot in 4 weeks     Vial A 1:1 0.5 ml 08/15/24 11:45 AM R:RRA  Vial B 1:1 0.5 ml  ________________________L; RRA  -Issues last injection: None  -Allergy meds taken today:  Yes  -Pre Peakflow: 260  -Post Peakflow:  -Reaction: None  -Next shot in 4 weeks

## 2024-09-12 ENCOUNTER — APPOINTMENT (OUTPATIENT)
Dept: ALLERGY | Facility: CLINIC | Age: 54
End: 2024-09-12
Payer: COMMERCIAL

## 2024-09-19 ENCOUNTER — APPOINTMENT (OUTPATIENT)
Dept: ALLERGY | Facility: CLINIC | Age: 54
End: 2024-09-19
Payer: COMMERCIAL

## 2024-09-19 DIAGNOSIS — J30.81 ALLERGIC RHINITIS DUE TO ANIMAL (CAT) (DOG) HAIR AND DANDER: Primary | ICD-10-CM

## 2024-09-19 PROCEDURE — 95117 IMMUNOTHERAPY INJECTIONS: CPT | Performed by: PEDIATRICS

## 2024-09-19 NOTE — PROGRESS NOTES
"Subjective   Patient ID: Halley Dave is a 52 y.o. female.    Chief Complaint: Allergy Shot      ALLERGEN IMMUNOTHERAPY ADMINISTRATION FORM:  ##########################################################  Vial A: ct, dog, dust mite   Vial B: tree, ragweed, weeds     HALLEY has reached the allergy shot MAINTENANCE dose on: 12/27/2018  ##########################################################       ========================================  06/02/2022 - allergy testing showed hugely improved results. We'll continue the shots for 1 more year and talk about stopping after May 2023  ========================================          Vial A 1:1 0.5 ml 08/03/2023 12:31 PM,Y;Y;R;300/;No Reaction;RRA  Vial B 1:1 0.5 ml _____________\"______________L;No Reaction;RRA     Vial A 1:1 0.5 ml 08/31/2023 11:50 AM,Y;Y;R;350/;No Reaction;RRA  Vial B 1:1 0.5 ml _____________\"______________L;No Reaction;RRA     Vial A 1:1 0.5 ml 09/28/2023 2:48 PM,Y;Y;R;320/;No Reaction;RRA  Vial B 1:1 0.5 ml _____________\"______________L;No Reaction;RRA     Vial A 1:1 0.5 ml   Vial B 1:1 0.5 ml11/02/23 11:34 AM R:RRA   ________________________L; RRA  -Issues last injection: None  -Allergy meds taken today:  Yes  -Pre Peakflow: 310  -Post Peakflow:  -Reaction: None  -Next shot in 4 weeks    Vial A 1:1 0.5 ml 11/30/23 11:47 AM R:RRA  Vial B 1:1 0.5 ml________________________L; RRA  -Issues last injection: None  -Allergy meds taken today:  Yes  -Pre Peakflow: 330  -Post Peakflow:  -Reaction: None  -Next shot in 4 weeks     Vial A 1:1 0.5 ml 12/28/2023 11:05 AM R:RRA  Vial B 1:1 0.5 ml ________________________L; RRA  -Issues last injection: None  -Allergy meds taken today:  Yes  -Pre Peakflow: 320  -Post Peakflow: 320  -Reaction: None  -Next shot in 4 weeks    Vial A 1:1 0.25 ml(late) 02/29/24 11:05 AM R:RRA  Vial B 1:1 0.25 ml (late)________________________L; RRA  -Issues last injection: None  -Allergy meds taken today:  Yes  -Pre Peakflow: 280  -Post " Peakflow:  -Reaction: None  -Next shot in 4 weeks * needs new vials*    Vial A 1:1 0.25 ml (new vial) 03/28/24 11:35 AM R:RRA  Vial B 1:1 0.25 ml (new vial) ________________________L; RRA  -Issues last injection: None  -Allergy meds taken today:  Yes  -Pre Peakflow: 270  -Post Peakflow:  -Reaction: None  -Next shot in 4 weeks      Vial A 1:1 0.5 ml 04/25/24 9:22 AM R:RRA  Vial B 1:1 0.5 ml ________________________L; RRA  -Issues last injection: None  -Allergy meds taken today:  Yes  -Pre Peakflow: 330  -Post Peakflow:  -Reaction: None  -Next shot in 4 weeks    Vial A 1:1 0.5 ml 5/23/2024 shot given by not documented  Vial B 1:1 0.5 ml 5/23/2024 shot given but not documented    Vial A 1:1 0.5 ml 06/27/24 12:36 PM R:RRA   Vial B 1:1 0.5 ml ________________________L; RRA   -Issues last injection: None  -Allergy meds taken today:  Yes  -Pre Peakflow: 290  -Post Peakflow:  -Reaction: None  -Next shot in 4 weeks     Vial A 1:1 0.5 ml 07/18/24 12:02 PM R:RRA  Vial B 1:1 0.5 ml ________________________L; RRA   -Issues last injection: None  -Allergy meds taken today:  Yes  -Pre Peakflow: Lungs Clear - by way of auscultation    -Post Peakflow:  -Reaction: None  -Next shot in 4 weeks     Vial A 1:1 0.5 ml 08/15/24 11:45 AM R:RRA  Vial B 1:1 0.5 ml  ________________________L; RRA  -Issues last injection: None  -Allergy meds taken today:  Yes  -Pre Peakflow: 260  -Post Peakflow:  -Reaction: None  -Next shot in 4 weeks    Vial A 1:1 0.5 ml 09/19/24 10:38 AM R:RRA   Vial B 1:1 0.5 ml  ________________________L; RRA  -Issues last injection: None  -Allergy meds taken today:  Yes  -Pre Peakflow: 310  -Post Peakflow:  -Reaction: None  -Next shot in 4 weeks

## 2024-10-10 ENCOUNTER — APPOINTMENT (OUTPATIENT)
Dept: ALLERGY | Facility: CLINIC | Age: 54
End: 2024-10-10
Payer: COMMERCIAL

## 2024-10-10 DIAGNOSIS — J30.1 ACUTE SEASONAL ALLERGIC RHINITIS DUE TO POLLEN: Primary | ICD-10-CM

## 2024-10-10 PROCEDURE — 95117 IMMUNOTHERAPY INJECTIONS: CPT | Performed by: PEDIATRICS

## 2024-11-06 NOTE — PROGRESS NOTES
"Subjective   Patient ID: Halley Dave is a 52 y.o. female.    Chief Complaint: Allergy Shot      ALLERGEN IMMUNOTHERAPY ADMINISTRATION FORM:  ###################################################  Vial A: ct, dog, dust mite   Vial B: tree, ragweed, weeds     HALLEY has reached the allergy shot MAINTENANCE dose on: 12/27/2018  ###################################################     may 2024  After 5 years of allergy shots, many of the allergies have gotten into remissionâ€“cat, dog. Other allergies have significantly improvedâ€“ragweed went down from 70 down to 13 KU/L, but she is still allergic so we do not want to stop shots too soon and risk the chance of allergies coming back.     We'll continue shots for another year and talk about stopping shots in 2024.  ____________________________________________________________        Vial A 1:1 0.5 ml 08/03/2023 12:31 PM,Y;Y;R;300/;No Reaction;RRA  Vial B 1:1 0.5 ml _____________\"______________L;No Reaction;RRA     Vial A 1:1 0.5 ml 08/31/2023 11:50 AM,Y;Y;R;350/;No Reaction;RRA  Vial B 1:1 0.5 ml _____________\"______________L;No Reaction;RRA     Vial A 1:1 0.5 ml 09/28/2023 2:48 PM,Y;Y;R;320/;No Reaction;RRA  Vial B 1:1 0.5 ml _____________\"______________L;No Reaction;RRA     Vial A 1:1 0.5 ml   Vial B 1:1 0.5 ml11/02/23 11:34 AM R:RRA   ________________________L; RRA  -Issues last injection: None  -Allergy meds taken today:  Yes  -Pre Peakflow: 310  -Post Peakflow:  -Reaction: None  -Next shot in 4 weeks    Vial A 1:1 0.5 ml 11/30/23 11:47 AM R:RRA  Vial B 1:1 0.5 ml________________________L; RRA  -Issues last injection: None  -Allergy meds taken today:  Yes  -Pre Peakflow: 330  -Post Peakflow:  -Reaction: None  -Next shot in 4 weeks     Vial A 1:1 0.5 ml 12/28/2023 11:05 AM R:RRA  Vial B 1:1 0.5 ml ________________________L; RRA  -Issues last injection: None  -Allergy meds taken today:  Yes  -Pre Peakflow: 320  -Post Peakflow: 320  -Reaction: None  -Next shot in 4 " weeks    Vial A 1:1 0.25 ml(late) 02/29/24 11:05 AM R:RRA  Vial B 1:1 0.25 ml (late)________________________L; RRA  -Issues last injection: None  -Allergy meds taken today:  Yes  -Pre Peakflow: 280  -Post Peakflow:  -Reaction: None  -Next shot in 4 weeks * needs new vials*    Vial A 1:1 0.25 ml (new vial) 03/28/24 11:35 AM R:RRA  Vial B 1:1 0.25 ml (new vial) ________________________L; RRA  -Issues last injection: None  -Allergy meds taken today:  Yes  -Pre Peakflow: 270  -Post Peakflow:  -Reaction: None  -Next shot in 4 weeks      Vial A 1:1 0.5 ml 04/25/24 9:22 AM R:RRA  Vial B 1:1 0.5 ml ________________________L; RRA  -Issues last injection: None  -Allergy meds taken today:  Yes  -Pre Peakflow: 330  -Post Peakflow:  -Reaction: None  -Next shot in 4 weeks    Vial A 1:1 0.5 ml 5/23/2024 shot given by not documented  Vial B 1:1 0.5 ml 5/23/2024 shot given but not documented    Vial A 1:1 0.5 ml 06/27/24 12:36 PM R:RRA   Vial B 1:1 0.5 ml ________________________L; RRA   -Issues last injection: None  -Allergy meds taken today:  Yes  -Pre Peakflow: 290  -Post Peakflow:  -Reaction: None  -Next shot in 4 weeks     ____________________________________________________________       Vial A 1:1 0.5 ml 07/18/24 12:02 PM R:RRA  Vial B 1:1 0.5 ml ________________________L; RRA   -Issues last injection: None  -Allergy meds taken today:  Yes  -Pre Peakflow: Lungs Clear - by way of auscultation    -Post Peakflow:  -Reaction: None  -Next shot in 4 weeks     Vial A 1:1 0.5 ml 08/15/24 11:45 AM R:RRA  Vial B 1:1 0.5 ml  ________________________L; RRA  -Issues last injection: None  -Allergy meds taken today:  Yes  -Pre Peakflow: 260  -Post Peakflow:  -Reaction: None  -Next shot in 4 weeks    Vial A 1:1 0.5 ml 09/19/24 10:38 AM R:RRA   Vial B 1:1 0.5 ml  ________________________L; RRA  -Issues last injection: None  -Allergy meds taken today:  Yes  -Pre Peakflow: 310  -Post Peakflow:  -Reaction: None  -Next shot in 4 weeks     Vial A  1:1 0.5 ml 10/10/24 11:11 AM R:RRA    Vial B 1:1 0.5 ml  ________________________L; RRA  -Issues last injection: None  -Allergy meds taken today:  Yes  -Pre Peakflow: 280  -Post Peakflow:  -Reaction: None  -Next shot in 4 weeks     Vial A 1:1 0.3 ml 11.07.2024 11:11 AM R:RRA    Vial B 1:1 0.4 ml  ________________________L; RRA  -Issues last injection: None  -Allergy meds taken today:  Yes  -Pre Peakflow:  320  -Post Peakflow:  -Reaction: None  -Next shot in 4 weeks *Need new vials*    *Lets do allergy tests 1st.

## 2024-11-07 ENCOUNTER — APPOINTMENT (OUTPATIENT)
Dept: ALLERGY | Facility: CLINIC | Age: 54
End: 2024-11-07
Payer: COMMERCIAL

## 2024-11-07 DIAGNOSIS — J30.1 ACUTE SEASONAL ALLERGIC RHINITIS DUE TO POLLEN: Primary | ICD-10-CM

## 2024-11-07 PROCEDURE — 95117 IMMUNOTHERAPY INJECTIONS: CPT | Performed by: PEDIATRICS

## 2024-11-10 DIAGNOSIS — J30.1 ACUTE SEASONAL ALLERGIC RHINITIS DUE TO POLLEN: Primary | ICD-10-CM

## 2024-12-05 ENCOUNTER — APPOINTMENT (OUTPATIENT)
Dept: ALLERGY | Facility: CLINIC | Age: 54
End: 2024-12-05
Payer: COMMERCIAL

## 2024-12-09 ENCOUNTER — LAB (OUTPATIENT)
Dept: LAB | Facility: LAB | Age: 54
End: 2024-12-09
Payer: COMMERCIAL

## 2024-12-09 DIAGNOSIS — J30.1 ACUTE SEASONAL ALLERGIC RHINITIS DUE TO POLLEN: ICD-10-CM

## 2024-12-09 PROCEDURE — 82785 ASSAY OF IGE: CPT

## 2024-12-09 PROCEDURE — 36415 COLL VENOUS BLD VENIPUNCTURE: CPT

## 2024-12-09 PROCEDURE — 86003 ALLG SPEC IGE CRUDE XTRC EA: CPT

## 2024-12-10 LAB
A ALTERNATA IGE QN: <0.1 KU/L
A FUMIGATUS IGE QN: <0.1 KU/L
BERMUDA GRASS IGE QN: <0.1 KU/L
BOXELDER IGE QN: <0.1 KU/L
C HERBARUM IGE QN: <0.1 KU/L
CALIF WALNUT POLN IGE QN: 0.17 KU/L
CAT DANDER IGE QN: <0.1 KU/L
CMN PIGWEED IGE QN: <0.1 KU/L
COMMON RAGWEED IGE QN: 4.69 KU/L
COTTONWOOD IGE QN: 0.13 KU/L
D FARINAE IGE QN: 0.39 KU/L
D PTERONYSS IGE QN: 0.32 KU/L
DOG DANDER IGE QN: 0.24 KU/L
ENGL PLANTAIN IGE QN: <0.1 KU/L
GOOSEFOOT IGE QN: <0.1 KU/L
JOHNSON GRASS IGE QN: <0.1 KU/L
KENT BLUE GRASS IGE QN: 0.14 KU/L
LONDON PLANE IGE QN: <0.1 KU/L
MT JUNIPER IGE QN: <0.1 KU/L
P NOTATUM IGE QN: <0.1 KU/L
PECAN/HICK TREE IGE QN: 0.12 KU/L
ROACH IGE QN: <0.1 KU/L
SALTWORT IGE QN: <0.1 KU/L
SHEEP SORREL IGE QN: <0.1 KU/L
SILVER BIRCH IGE QN: 0.72 KU/L
TIMOTHY IGE QN: 0.12 KU/L
TOTAL IGE SMQN RAST: 66 KU/L
WHITE ASH IGE QN: 0.21 KU/L
WHITE ELM IGE QN: 0.17 KU/L
WHITE MULBERRY IGE QN: <0.1 KU/L
WHITE OAK IGE QN: 0.92 KU/L

## 2024-12-11 ENCOUNTER — DOCUMENTATION (OUTPATIENT)
Dept: ALLERGY | Facility: CLINIC | Age: 54
End: 2024-12-11
Payer: COMMERCIAL

## 2024-12-11 NOTE — PROGRESS NOTES
The allergy testing results look better than ever.     You still have a bit of allergy to ragweed in the fall.     I am inclined to recommend to stop the shots!     You may still need to use some Allegra or Zyrtec on as needed bases in the peak of spring and fall pollen season, but I expect the symptoms to be mild and easily controlled.     (As per Halley's  request, we could do a blood test in a year to make sure the allergies are not coming back).